# Patient Record
Sex: MALE | Race: WHITE
[De-identification: names, ages, dates, MRNs, and addresses within clinical notes are randomized per-mention and may not be internally consistent; named-entity substitution may affect disease eponyms.]

---

## 2018-09-14 ENCOUNTER — HOSPITAL ENCOUNTER (EMERGENCY)
Dept: HOSPITAL 89 - ER | Age: 68
Discharge: HOME | End: 2018-09-14
Payer: COMMERCIAL

## 2018-09-14 VITALS — SYSTOLIC BLOOD PRESSURE: 127 MMHG | DIASTOLIC BLOOD PRESSURE: 84 MMHG

## 2018-09-14 DIAGNOSIS — J18.1: Primary | ICD-10-CM

## 2018-09-14 LAB — PLATELET COUNT, AUTOMATED: 283 K/UL (ref 150–450)

## 2018-09-14 PROCEDURE — 84450 TRANSFERASE (AST) (SGOT): CPT

## 2018-09-14 PROCEDURE — 82947 ASSAY GLUCOSE BLOOD QUANT: CPT

## 2018-09-14 PROCEDURE — 99284 EMERGENCY DEPT VISIT MOD MDM: CPT

## 2018-09-14 PROCEDURE — 84075 ASSAY ALKALINE PHOSPHATASE: CPT

## 2018-09-14 PROCEDURE — 85025 COMPLETE CBC W/AUTO DIFF WBC: CPT

## 2018-09-14 PROCEDURE — 71046 X-RAY EXAM CHEST 2 VIEWS: CPT

## 2018-09-14 PROCEDURE — 84295 ASSAY OF SERUM SODIUM: CPT

## 2018-09-14 PROCEDURE — 84132 ASSAY OF SERUM POTASSIUM: CPT

## 2018-09-14 PROCEDURE — 84155 ASSAY OF PROTEIN SERUM: CPT

## 2018-09-14 PROCEDURE — 82565 ASSAY OF CREATININE: CPT

## 2018-09-14 PROCEDURE — 82310 ASSAY OF CALCIUM: CPT

## 2018-09-14 PROCEDURE — 96360 HYDRATION IV INFUSION INIT: CPT

## 2018-09-14 PROCEDURE — 82435 ASSAY OF BLOOD CHLORIDE: CPT

## 2018-09-14 PROCEDURE — 82247 BILIRUBIN TOTAL: CPT

## 2018-09-14 PROCEDURE — 84460 ALANINE AMINO (ALT) (SGPT): CPT

## 2018-09-14 PROCEDURE — 74019 RADEX ABDOMEN 2 VIEWS: CPT

## 2018-09-14 PROCEDURE — 82040 ASSAY OF SERUM ALBUMIN: CPT

## 2018-09-14 PROCEDURE — 84520 ASSAY OF UREA NITROGEN: CPT

## 2018-09-14 PROCEDURE — 81001 URINALYSIS AUTO W/SCOPE: CPT

## 2018-09-14 PROCEDURE — 82374 ASSAY BLOOD CARBON DIOXIDE: CPT

## 2018-09-14 NOTE — RADIOLOGY IMAGING REPORT
FACILITY: [facility] [place] 

 

PATIENT NAME: Dhiraj Burns

: 1950

MR: [id number]

V: [id number]

EXAM DATE: 

ORDERING PHYSICIAN: ANTHONY DENISE

TECHNOLOGIST: 

 

Location: [facility]

Patient: Dhiraj Burns

: 1950

MRN: [id number]

Visit/Account:[id number]

Date of Sevice:  2018

 

ACCESSION #: [id number]

 

 

INDICATION:  diarrhea.  Cough and flulike symptoms.

 

DATE: 2018 2:48 PM.

 

TECHNIQUE: ABDOMEN AP AND ERECT/DECUB, CHEST PA AND LAT

 

COMPARISON: Chest radiograph 2016

 

 

FINDINGS:

 

ABDOMEN: There is a relative paucity of gas in the small bowel.  Some gas is present within the colon
.  Mild leftward curvature of the lumbar spine is noted..

 

CHEST: Heart size is normal.  There is patchy opacity laterally at the left base and in the left midl
mikayla.  The midlung opacity is exaggerated by overlapping rib shadow.  There is a trace left-sided effu
ara.  The right lung is clear.

 

IMPRESSION:

Patchy opacity in the left midlung and at the left base could represent developing infection.  There 
is a trace left-sided effusion.

 

Report Dictated By: Veena Fierro MD at 2018 2:48 PM

 

Report E-Signed By: Veena Fierro MD  at 2018 2:53 PM

 

WSN:[id number]

## 2018-09-14 NOTE — RADIOLOGY IMAGING REPORT
FACILITY: [facility] 

 

PATIENT NAME: Dhiraj Burns

: 1950

MR: [id number]

V: [id number]

EXAM DATE: 

ORDERING PHYSICIAN: ANTHONY DENISE

TECHNOLOGIST: 

 

Location: [facility] [place]

Patient: Dhiraj Burns

: 1950

MRN: [id number]

Visit/Account:[id number]

Date of Sevice:  2018

 

ACCESSION #: [id number]

 

 

INDICATION:  diarrhea.  Cough and flulike symptoms.

 

DATE: 2018 2:48 PM.

 

TECHNIQUE: ABDOMEN AP AND ERECT/DECUB, CHEST PA AND LAT

 

COMPARISON: Chest radiograph 2016

 

 

FINDINGS:

 

ABDOMEN: There is a relative paucity of gas in the small bowel.  Some gas is present within the colon
.  Mild leftward curvature of the lumbar spine is noted..

 

CHEST: Heart size is normal.  There is patchy opacity laterally at the left base and in the left midl
mikayla.  The midlung opacity is exaggerated by overlapping rib shadow.  There is a trace left-sided effu
ara.  The right lung is clear.

 

IMPRESSION:

Patchy opacity in the left midlung and at the left base could represent developing infection.  There 
is a trace left-sided effusion.

 

Report Dictated By: Veena Fierro MD at 2018 2:48 PM

 

Report E-Signed By: Veena Fierro MD  at 2018 2:53 PM

 

WSN:[id number]

## 2018-09-14 NOTE — ER REPORT
History and Physical


Time Seen By MD:  13:43


Hx. of Stated Complaint:  


CHILLS, FATIGUE, N/V/D


HPI/ROS


CHIEF COMPLAINT: Flulike symptoms





HISTORY OF PRESENT ILLNESS: 68-year-old male patient presents to emergency room 


with complaint of flulike symptoms. Patient states that he's had been ill twice 


in the last 3 weeks which is unusual for him. States that the first time was 3 


weeks ago, he had diarrhea and was just exhausted. He states that he slept a lot


during that time. He states that during that time he also felt like there was 


some fluid buildup in the left upper side of his chest. He states that he is not


able to lay on his stomach is asking did make that feel worse. He denies any 


vomiting, however he has had approximate 5 episodes of diarrhea and has been 


nauseated. Patient denies having any fevers. He states he did take some Imodium 


for this which seem to help.





REVIEW OF SYSTEMS:


Respiratory: No cough, no dyspnea.


Cardiovascular: No chest pain, no palpitations.


Gastrointestinal: As noted above


Musculoskeletal: No back pain.


Allergies:  


Coded Allergies:  


     No Known Drug Allergies (Unverified , 10/28/16)


Home Meds


Active Scripts


Diphenoxylate Hcl/Atropine (LOMOTIL TABLET) 1 Each Tablet, 1 EACH PO QID PRN for


DIARRHEA, #10 TAB


   Prov:ANTHONY DENISE [facility]         9/14/18


Levofloxacin 500 Mg Tab (LEVAQUIN 500 MG TAB) 500 Mg Tablet, 500 MG PO DAILY, 


#10 TAB


   Prov:ANTHONY DENISE [facility]         9/14/18


Pantoprazole Sodium (PANTOPRAZOLE SODIUM) 40 Mg Tablet.dr, 1 TAB PO QDAY, #30 


TAB.SR 6 Refills


   Prov:CIRO MAI MD         11/22/16


Atorvastatin (LIPITOR) 80 Mg Tab, 1 TAB PO QDAY, #30 TAB 6 Refills


   Prov:CIRO MAI MD         11/22/16


Reported Medications


Metoprolol Tartrate (METOPROLOL TARTRATE) 100 Mg Tablet, 1 TAB PO BID, TAB


   9/14/18


Losartan Potassium (LOSARTAN POTASSIUM) 50 Mg Tablet, 50 MG PO QDAY


   9/14/18


Furosemide (FUROSEMIDE) 40 Mg Tablet, 2 TAB PO DAILY, TAB


   9/14/18


Fluticasone/Vilanterol (Breo Ellipta 200-25 Mcg INH) 1 Each Blst.w.dev, 1 PUFF 


IH QPM


   9/14/18


Aspirin (ASPIRIN) 325 Mg Tablet, 325 MG PO DAILY, TAB


   9/14/18


Discontinued Reported Medications


Metoprolol Succinate (METOPROLOL SUCCINATE) 50 Mg Tab.er.24h, 2 TAB PO QDAY, TAB


   9/14/18


Zolpidem Tartrate (AMBIEN) 10 Mg Tablet, 1 TAB PO QHS PRN for SLEEP, TAB


   11/22/16


Discontinued Scripts


Hydrocodone Bit/Acetaminophen (HYDROCODON-ACETAMINOPHEN 5-325) 1 Each Tablet, 1 


EACH PO QID PRN for PAIN, #40 TAB


   Prov:CIRO MAI MD         12/6/16


Topiramate (TOPAMAX) 50 Mg Tablet, 1 TAB PO BID, #60 TAB 5 Refills


   Prov:CIRO MAI MD         11/22/16


Hydrochlorothiazide (HYDROCHLOROTHIAZIDE) 25 Mg Tablet, 1 TAB PO QDAY, #30 TAB 6


Refills


   Prov:CIRO MAI MD         11/22/16


Atenolol (ATENOLOL) 25 Mg Tablet, 1 TAB PO BID, #60 TAB 6 Refills


   Prov:CIRO MAI MD         11/22/16


Amlodipine Besylate (AMLODIPINE BESYLATE) 5 Mg Tablet, 1 TAB PO QDAY, #30 TAB 6 


Refills


   Prov:CIRO MAI MD         11/22/16


Past Medical/Surgical History


Patient has a past medical history of hypertension, hyperlipidemia, COPD.


Patient has surgical history of three-vessel bypass, neurologic surgery.


Reviewed Nurses Notes:  Yes


Smoking Status:  Former Smoker


Hx Substance Use Disorder:  No


Hx Alcohol Use:  No


Constitutional





Vital Sign - Last 24 Hours








 9/14/18 9/14/18





 13:33 15:45


 


Temp 98.1 


 


Pulse 63 70


 


Resp 18 18


 


B/P (MAP) 127/74 127/84 (98)


 


Pulse Ox 95 94


 


O2 Delivery Room Air Room Air








Physical Exam


  General Appearance: The patient is alert, has no immediate need for airway 


protection and no current signs of toxicity.


Respiratory: Chest is non tender, lungs are clear to auscultation.


Cardiac: regular rate and rhythm


Gastrointestinal: Abdomen is soft and tender in bilateral lower quadrants, no 


masses, bowel sounds normal.


Musculoskeletal:  Neck: Neck is supple and non tender.


   Extremities have full range of motion and are non tender.


Skin: No rashes or lesions.





DIFFERENTIAL DIAGNOSIS: After history and physical exam differential diagnosis 


was considered for gastroenteritis, viral syndrome, pneumonia, upper respiratory


infection, sinusitis.





Medical Decision Making


Data Points


Result Diagram:  


9/14/18 1422                                                                    


           9/14/18 1422





Laboratory





Hematology








Test


 9/14/18


13:34 9/14/18


14:22


 


Urine Color Yellow  


 


Urine Clarity Clear  


 


Urine pH


 5.0 pH


(4.8-9.5) 





 


Urine Specific Gravity 1.018  


 


Urine Protein


 Negative mg/dL


(NEGATIVE) 





 


Urine Glucose (UA)


 Negative mg/dL


(NEGATIVE) 





 


Urine Ketones


 Negative mg/dL


(NEGATIVE) 





 


Urine Blood


 Negative


(NEGATIVE) 





 


Urine Nitrite


 Negative


(NEGATIVE) 





 


Urine Bilirubin


 Negative


(NEGATIVE) 





 


Urine Urobilinogen


 Negative mg/dL


(0.2-1.9) 





 


Urine Leukocyte Esterase


 Negative


(NEGATIVE) 





 


Urine RBC


 1 /HPF


(0-2/HPF) 





 


Urine WBC


 1 /HPF


(0-5/HPF) 





 


Urine Squamous Epithelial


Cells Few /LPF


(</=FEW) 





 


Urine Bacteria


 Negative /HPF


(NONE-FEW) 





 


Urine Hyaline Casts


 Many /LPF


(NONE-FEW) 





 


Urine Mucus


 Few /HPF


(NONE-FEW) 





 


Red Blood Count


 


 4.19 M/uL


(4.00-5.60)


 


Mean Corpuscular Volume


 


 87.9 fL


(80.0-96.0)


 


Mean Corpuscular Hemoglobin


 


 29.3 pg


(26.0-33.0)


 


Mean Corpuscular Hemoglobin


Concent 


 33.4 g/dL


(32.0-36.0)


 


Red Cell Distribution Width


 


 15.7 %


(11.5-14.5)


 


Mean Platelet Volume


 


 7.3 fL


(7.2-11.1)


 


Neutrophils (%) (Auto)


 


 90.9 %


(39.4-72.5)


 


Lymphocytes (%) (Auto)


 


 2.7 %


(17.6-49.6)


 


Monocytes (%) (Auto)


 


 5.5 %


(4.1-12.4)


 


Eosinophils (%) (Auto)


 


 0.7 %


(0.4-6.7)


 


Basophils (%) (Auto)


 


 0.2 %


(0.3-1.4)


 


Nucleated RBC Relative Count


(auto) 


 0.0 /100WBC 





 


Neutrophils # (Auto)


 


 8.7 K/uL


(2.0-7.4)


 


Lymphocytes # (Auto)


 


 0.3 K/uL


(1.3-3.6)


 


Monocytes # (Auto)


 


 0.5 K/uL


(0.3-1.0)


 


Eosinophils # (Auto)


 


 0.1 K/uL


(0.0-0.5)


 


Basophils # (Auto)


 


 0.0 K/uL


(0.0-0.1)


 


Nucleated RBC Absolute Count


(auto) 


 0.00 K/uL 





 


Sodium Level


 


 132 mmol/L


(137-145)


 


Potassium Level


 


 3.2 mmol/L


(3.5-5.0)


 


Chloride Level


 


 95 mmol/L


()


 


Carbon Dioxide Level


 


 25 mmol/L


(22-30)


 


Blood Urea Nitrogen


 


 19 mg/dl


(9-21)


 


Creatinine


 


 1.20 mg/dl


(0.66-1.25)


 


Glomerular Filtration Rate


Calc 


 > 60.0 





 


Random Glucose


 


 152 mg/dl


()


 


Calcium Level


 


 8.5 mg/dl


(8.4-10.2)


 


Total Bilirubin


 


 0.4 mg/dl


(0.2-1.3)


 


Aspartate Amino Transf


(AST/SGOT) 


 34 U/L (0-35) 





 


Alanine Aminotransferase


(ALT/SGPT) 


 36 U/L (0-56) 





 


Alkaline Phosphatase  80 U/L (0-126) 


 


Total Protein


 


 6.4 g/dl


(6.3-8.2)


 


Albumin


 


 3.5 g/dl


(3.5-5.0)








Chemistry








Test


 9/14/18


13:34 9/14/18


14:22


 


Urine Color Yellow  


 


Urine Clarity Clear  


 


Urine pH


 5.0 pH


(4.8-9.5) 





 


Urine Specific Gravity 1.018  


 


Urine Protein


 Negative mg/dL


(NEGATIVE) 





 


Urine Glucose (UA)


 Negative mg/dL


(NEGATIVE) 





 


Urine Ketones


 Negative mg/dL


(NEGATIVE) 





 


Urine Blood


 Negative


(NEGATIVE) 





 


Urine Nitrite


 Negative


(NEGATIVE) 





 


Urine Bilirubin


 Negative


(NEGATIVE) 





 


Urine Urobilinogen


 Negative mg/dL


(0.2-1.9) 





 


Urine Leukocyte Esterase


 Negative


(NEGATIVE) 





 


Urine RBC


 1 /HPF


(0-2/HPF) 





 


Urine WBC


 1 /HPF


(0-5/HPF) 





 


Urine Squamous Epithelial


Cells Few /LPF


(</=FEW) 





 


Urine Bacteria


 Negative /HPF


(NONE-FEW) 





 


Urine Hyaline Casts


 Many /LPF


(NONE-FEW) 





 


Urine Mucus


 Few /HPF


(NONE-FEW) 





 


White Blood Count


 


 9.5 k/uL


(4.5-11.0)


 


Red Blood Count


 


 4.19 M/uL


(4.00-5.60)


 


Hemoglobin


 


 12.3 g/dL


(14.0-18.0)


 


Hematocrit


 


 36.9 %


(42.0-52.0)


 


Mean Corpuscular Volume


 


 87.9 fL


(80.0-96.0)


 


Mean Corpuscular Hemoglobin


 


 29.3 pg


(26.0-33.0)


 


Mean Corpuscular Hemoglobin


Concent 


 33.4 g/dL


(32.0-36.0)


 


Red Cell Distribution Width


 


 15.7 %


(11.5-14.5)


 


Platelet Count


 


 283 K/uL


(150-450)


 


Mean Platelet Volume


 


 7.3 fL


(7.2-11.1)


 


Neutrophils (%) (Auto)


 


 90.9 %


(39.4-72.5)


 


Lymphocytes (%) (Auto)


 


 2.7 %


(17.6-49.6)


 


Monocytes (%) (Auto)


 


 5.5 %


(4.1-12.4)


 


Eosinophils (%) (Auto)


 


 0.7 %


(0.4-6.7)


 


Basophils (%) (Auto)


 


 0.2 %


(0.3-1.4)


 


Nucleated RBC Relative Count


(auto) 


 0.0 /100WBC 





 


Neutrophils # (Auto)


 


 8.7 K/uL


(2.0-7.4)


 


Lymphocytes # (Auto)


 


 0.3 K/uL


(1.3-3.6)


 


Monocytes # (Auto)


 


 0.5 K/uL


(0.3-1.0)


 


Eosinophils # (Auto)


 


 0.1 K/uL


(0.0-0.5)


 


Basophils # (Auto)


 


 0.0 K/uL


(0.0-0.1)


 


Nucleated RBC Absolute Count


(auto) 


 0.00 K/uL 





 


Glomerular Filtration Rate


Calc 


 > 60.0 





 


Calcium Level


 


 8.5 mg/dl


(8.4-10.2)


 


Total Bilirubin


 


 0.4 mg/dl


(0.2-1.3)


 


Aspartate Amino Transf


(AST/SGOT) 


 34 U/L (0-35) 





 


Alanine Aminotransferase


(ALT/SGPT) 


 36 U/L (0-56) 





 


Alkaline Phosphatase  80 U/L (0-126) 


 


Total Protein


 


 6.4 g/dl


(6.3-8.2)


 


Albumin


 


 3.5 g/dl


(3.5-5.0)








Urinalysis








Test


 9/14/18


13:34


 


Urine Color Yellow 


 


Urine Clarity Clear 


 


Urine pH


 5.0 pH


(4.8-9.5)


 


Urine Specific Gravity 1.018 


 


Urine Protein


 Negative mg/dL


(NEGATIVE)


 


Urine Glucose (UA)


 Negative mg/dL


(NEGATIVE)


 


Urine Ketones


 Negative mg/dL


(NEGATIVE)


 


Urine Blood


 Negative


(NEGATIVE)


 


Urine Nitrite


 Negative


(NEGATIVE)


 


Urine Bilirubin


 Negative


(NEGATIVE)


 


Urine Urobilinogen


 Negative mg/dL


(0.2-1.9)


 


Urine Leukocyte Esterase


 Negative


(NEGATIVE)


 


Urine RBC


 1 /HPF


(0-2/HPF)


 


Urine WBC


 1 /HPF


(0-5/HPF)


 


Urine Squamous Epithelial


Cells Few /LPF


(</=FEW)


 


Urine Bacteria


 Negative /HPF


(NONE-FEW)


 


Urine Hyaline Casts


 Many /LPF


(NONE-FEW)


 


Urine Mucus


 Few /HPF


(NONE-FEW)











EKG/Imaging


Imaging


TECHNIQUE: ABDOMEN AP AND ERECT/DECUB, CHEST PA AND LAT


 


COMPARISON: Chest radiograph October 28, 2016


 


 


FINDINGS:


 


ABDOMEN: There is a relative paucity of gas in the small bowel.  Some gas is 


present within the colon.  Mild leftward curvature of the lumbar spine is 


noted..


 


CHEST: Heart size is normal.  There is patchy opacity laterally at the left base


and in the left midlung.  The midlung opacity is exaggerated by overlapping rib 


shadow.  There is a trace left-sided effusion.  The right lung is clear.


 


IMPRESSION:


Patchy opacity in the left midlung and at the left base could represent 


developing infection.  There is a trace left-sided effusion.


 


Report Dictated By: Veena Fierro MD at 9/14/2018 2:48 PM


 


Report E-Signed By: Veena Fierro MD  at 9/14/2018 2:53 PM





INDICATION:  diarrhea.  Cough and flulike symptoms.


 


DATE: 9/14/2018 2:48 PM.


 


TECHNIQUE: ABDOMEN AP AND ERECT/DECUB, CHEST PA AND LAT


 


COMPARISON: Chest radiograph October 28, 2016


 


 


FINDINGS:


 


ABDOMEN: There is a relative paucity of gas in the small bowel.  Some gas is 


present within the colon.  Mild leftward curvature of the lumbar spine is 


noted..


 


CHEST: Heart size is normal.  There is patchy opacity laterally at the left base


and in the left midlung.  The midlung opacity is exaggerated by overlapping rib 


shadow.  There is a trace left-sided effusion.  The right lung is clear.


 


IMPRESSION:


Patchy opacity in the left midlung and at the left base could represent 


developing infection.  There is a trace left-sided effusion.


 


Report Dictated By: Veena Fierro MD at 9/14/2018 2:48 PM


 


Report E-Signed By: Veena Fierro MD  at 9/14/2018 2:53 PM





ED Course/Re-evaluation


ED Course


Patient was admitted to exam room, history and physical were obtained. 


Differential diagnoses were considered. On examination lungs were clear, heart 


regular, abdomen was soft and tender in the bilateral lower quadrants. A CBC, 


CMP, urinalysis, chest x-ray, acute abdominal x-rays were done. Patient had a 


normal white count, however he did have a significant left shift with 90% 


neutrophils. CMP was unremarkable. Chest x-ray and acute abdominal x-rays showed


a opacity in the left upper lobe, the abdomen was unremarkable. I discussed 


findings with patient and his daughter. I do that he has a burgeoning pneumonia 


which is developing. We will go ahead and treat him with antibiotics. We will go


ahead and treat him with Levaquin 500 mg one tab daily for the next 10 days. He 


is to increase his fluid intake, get plenty of rest. He started to emergency 


room if condition worsens. We will go ahead and give him a prescription of 


Lomotil to help with the diarrhea. Patient verbalized understanding and 


agreement with plan.


Decision to Disposition Date:  Sep 14, 2018


Decision to Disposition Time:  15:40





Depart


Departure


Latest Vital Signs





Vital Signs








  Date Time  Temp Pulse Resp B/P (MAP) Pulse Ox O2 Delivery O2 Flow Rate FiO2


 


9/14/18 15:45  70 18 127/84 (98) 94 Room Air  


 


9/14/18 13:33 98.1       








Impression:  


   Primary Impression:  


   Pneumonia


Condition:  Improved


Disposition:  HOME OR SELF-CARE


New Scripts


Diphenoxylate Hcl/Atropine (LOMOTIL TABLET) 1 Each Tablet


1 EACH PO QID PRN for DIARRHEA, #10 TAB


   Prov: ANTHONY DENISE         9/14/18 


Levofloxacin 500 Mg Tab (LEVAQUIN 500 MG TAB) 500 Mg Tablet


500 MG PO DAILY, #10 TAB


   Prov: ANTHONY DENISE         9/14/18


Patient Instructions:  Acute Diarrhea (ED), Community Acquired Pneumonia (ED)





Additional Instructions:  


Increase fluid intake.


Get plenty of rest.


Follow up with your primary care provider in the next week.


Return to the ER if condition worsens.





Problem Qualifiers








   Primary Impression:  


   Pneumonia


   Pneumonia type:  due to unspecified organism  Laterality:  left  Lung 


   location:  upper lobe of lung  Qualified Codes:  J18.1 - Lobar pneumonia, 


   unspecified organism








ANTHONY DENISE                Sep 14, 2018 13:43